# Patient Record
(demographics unavailable — no encounter records)

---

## 2025-05-08 NOTE — REASON FOR VISIT
[Follow-Up] : a follow-up visit for [Palpitations] : palpitations [Patient] : patient [Parents] : parents

## 2025-05-15 NOTE — CONSULT LETTER
[Today's Date] : [unfilled] [Name] : Name: [unfilled] [] : : ~~ [Today's Date:] : [unfilled] [Dear  ___:] : Dear Dr. [unfilled]: [Consult] : I had the pleasure of evaluating your patient, [unfilled]. My full evaluation follows. [Consult - Single Provider] : Thank you very much for allowing me to participate in the care of this patient. If you have any questions, please do not hesitate to contact me. [Sincerely,] : Sincerely, [FreeTextEntry4] : Felisha Whipple MD [FreeTextEntry5] : [address] [FreeTextEntry6] : [place] [address] [de-identified] : Anum Daley MD,FACC, FASJAMESON, FAAP\par  Pediatric Cardiologist \par  [facility]\par

## 2025-05-15 NOTE — CONSULT LETTER
[Today's Date] : [unfilled] [Name] : Name: [unfilled] [] : : ~~ [Today's Date:] : [unfilled] [Dear  ___:] : Dear Dr. [unfilled]: [Consult] : I had the pleasure of evaluating your patient, [unfilled]. My full evaluation follows. [Consult - Single Provider] : Thank you very much for allowing me to participate in the care of this patient. If you have any questions, please do not hesitate to contact me. [Sincerely,] : Sincerely, [FreeTextEntry4] : Felisha Whipple MD [FreeTextEntry5] : [address] [FreeTextEntry6] : [place] [address] [de-identified] : Anum Daley MD,FACC, FASJAMESON, FAAP\par  Pediatric Cardiologist \par  [facility]\par

## 2025-05-15 NOTE — CARDIOLOGY SUMMARY
[Today's Date] : [unfilled] [de-identified] : 5/8/25 [de-identified] : 1. Normal study. 2. Trivial pulmonary valve regurgitation. 3. Trivial tricuspid valve regurgitation. 4. Normal left ventricular size, morphology and systolic function. 5. Normal right ventricular morphology with qualitatively normal size and systolic function. 6. The E/e' ratios are suggestive of normal left ventricular filling pressure. 7. No pericardial effusion. [FreeTextEntry1] : Normal sinus rhythm with sinus arrhythmia. Atrial and ventricular forces were normal. No ST segment or T-wave abnormality. HR 98; QTc 443-456 [de-identified] : 5/8/25 [FreeTextEntry2] : 1. Normal study. 2. Trivial pulmonary valve regurgitation. 3. Trivial tricuspid valve regurgitation. 4. Normal left ventricular size, morphology and systolic function. 5. Normal right ventricular morphology with qualitatively normal size and systolic function. 6. The E/e' ratios are suggestive of normal left ventricular filling pressure. 7. No pericardial effusion. [de-identified] : 6/2/23 [de-identified] : The predominant rhythm was normal sinus rhythm alternating with sinus bradycardia, sinus tachycardia and sinus arrhythmia. HR , average 89 bpm.  There were rare isolated premature ventricular complexes which occurred at an average of 3 bph. There were no ventricular couplets or ventricular tachycardia.  There was one episode of ectopic atrial rhythm at 61 bpm, which was not quantified because it occurred at a similar rate to the underlying sinus rate. There was a single premature supraventricular complex. No supraventricular tachycardia.   No symptoms were reported during the study.    12/12/22: The predominant rhythm was normal sinus rhythm alternating with sinus bradycardia, sinus tachycardia and sinus arrhythmia. HR , avg 100 bpm There were rare isolated premature ventricular complexes which occurred at an average of 4 bph. There were no ventricular couplets or ventricular tachycardia.  There were rare isolated premature supraventricular complexes which occurred at an average of 0.2 bph. There were no couplets or supraventricular tachycardia.  No symptoms were reported during the study.  (11/15/22: PVC's  avg 3 bph; one ventricular couplet - ms. One complaint of ' heart rate sped up' while playing a video game, which corresponded to sinus rhythm at 120-125 bpm)

## 2025-05-15 NOTE — DISCUSSION/SUMMARY
[PE + No Restrictions] : [unfilled] may participate in the entire physical education program without restriction, including all varsity competitive sports. [FreeTextEntry1] : - In summary, RAJENDRA is a 13 year female with palpitations which tend to occur when she is anxious. Sometimes she feels the fast heart rate and then gets anxious. She hyperventilates which leads to paresthesia.  - Her HR and blood pressure was normal today, when it was repeated after she relaxed.  - A previous Holter monitor showed rare PVC's and one ventricular couplet, which did not correspond to her symptoms. The 2 subsequent Holter monitors showed only rare PVC's, but no couplets. The last Holter monitor also showed ectopic atrial rhythm. These findings are normal variants Holter monitor was placed today  - If there are recurrent palpitations, her HR should be checked. I recommended that her BP be checked after she is seated and relaxed for >5 minutes.    - There is no evidence of a cardiac etiology of hypertension such as coarctation of the aorta. There is no evidence of left ventricular hypertrophy which may occur with long standing or severe hypertension.  - She should maintain good hydration. She should drink at least 8 cups of non-caffeinated beverages per day.  Caffeinated beverages should be avoided. Her fluid intake should be titrated to keep the urine dilute.  - I would like to reevaluate her in 2 months or sooner if there are any further cardiac concerns. - The family verbalized understanding, and all questions were answered.  [Needs SBE Prophylaxis] : [unfilled] does not need bacterial endocarditis prophylaxis

## 2025-05-15 NOTE — HISTORY OF PRESENT ILLNESS
[FreeTextEntry1] : RAJENDRA is a 13 year old female presents for cardiology follow up due to palpitations, ventricular ectopy,  elevated blood pressure.  Since I last saw her 7/5/23, the palpitations had resolved, but recurred about a month ago.  Sometimes she feels anxious first, but sometimes feels the fast heart rate and then gets anxious. then she breaths deeper and fast, and then left upper arm feels numb. She tries to distract herself and it resolves after 10 minutes. if she ate prior to the episode, she gets abd pain during the palpitations and then has emesis.  When HR checked with pulse ox, it was 110 bpm.   Also, her BP has been higher at pediatric visits, and when checked at home-after the palpitations have started.  She has been active and otherwise asymptomatic. There has been no other complaint of chest pain, palpitations, dyspnea, dizziness or syncope.  She is anxious, and Dr Whipple referred to a therapist.   - plays softball, year round, good exercise tolerance . - Daily fluid intake:  Water- 5 cups?, unsure of amount. occasional caffeine  Review of history from visit 1/4/23 She had 2 episodes of palpitations since she was seen in November - Once in December, felt her heart beating fast, possibly after running in her house. Mother checked her HR and it was 121 bpm, /86. She does not recall the details. - On 12/23, in the evening, she felt her HR fast. , /96. It was the first day of her period which makes her anxious. - Her Holter monitor from 11/15/22 showed rare premature ventricular contractions and one ventricular couplet. the ventricular couplet occurred at 4:52 pm and there was no reported symptoms at that time.  A f/u Holter monitor 12/12/22 showed rare PVC's and no couplets or ventricular tachycardia   I reviewed the lab results from 11/19/22 : total cholesterol 175 mg/dl;  ; HDL 54 ; triglycerides 78.  CBC, CMP and TFT's were normal.  Review of history from visit 11/15/22:  On 11/12/22, Saturday, she was sitting looking at ipad, felt a mild increase in HR, then panicked and felt her HR increase more. 'watches medical stuff on TV/ videos when mother watches it. She has seen heart attacks on TV and is freaked out about death'. then felt a normal HR when relaxed and then it would go faster when anxious again. Continued to feel intermittently fast HR since then, on pulse ox HR's 120's - 140's bpm.  On Monday 11/14/22. SAw Dr Whipple, At beginning of visit  bpm, /100. After started to relax HR 95 with /68, referred to cardio. Given a prescription for CBC, CMP, TFT's, Lyme Last night, at home, she got anxious again. /101 on home BP monitor.  Brought to [facility] ED, there was a long wait time due to many patients with RSV, and her symptoms resolved so they went home.   - Mild left upper breast pain, resolves with rubbing it for a few seconds.  - seen by misael Dorsey. history of 'very mildly decreased TSH with normal thyroid hormone levels'. told to f/u prn per mother   - father - hypertension started ~ late 20's-30's on med. hypercholesterolemia dx in 30's tx medication. DVT in leg in his late 30's dx with thrombophilia (factor V Leiden?)  - PGF- open heart surgery - late 60's-70's.

## 2025-05-15 NOTE — HISTORY OF PRESENT ILLNESS
[FreeTextEntry1] : RAJENDRA is a 13 year old female presents for cardiology follow up due to palpitations, ventricular ectopy,  elevated blood pressure.  Since I last saw her 7/5/23, the palpitations had resolved, but recurred about a month ago.  Sometimes she feels anxious first, but sometimes feels the fast heart rate and then gets anxious. then she breaths deeper and fast, and then left upper arm feels numb. She tries to distract herself and it resolves after 10 minutes. if she ate prior to the episode, she gets abd pain during the palpitations and then has emesis.  When HR checked with pulse ox, it was 110 bpm.   Also, her BP has been higher at pediatric visits, and when checked at home-after the palpitations have started.  She has been active and otherwise asymptomatic. There has been no other complaint of chest pain, palpitations, dyspnea, dizziness or syncope.  She is anxious, and Dr Whipple referred to a therapist.   - plays softball, year round, good exercise tolerance . - Daily fluid intake:  Water- 5 cups?, unsure of amount. occasional caffeine  Review of history from visit 1/4/23 She had 2 episodes of palpitations since she was seen in November - Once in December, felt her heart beating fast, possibly after running in her house. Mother checked her HR and it was 121 bpm, /86. She does not recall the details. - On 12/23, in the evening, she felt her HR fast. , /96. It was the first day of her period which makes her anxious. - Her Holter monitor from 11/15/22 showed rare premature ventricular contractions and one ventricular couplet. the ventricular couplet occurred at 4:52 pm and there was no reported symptoms at that time.  A f/u Holter monitor 12/12/22 showed rare PVC's and no couplets or ventricular tachycardia   I reviewed the lab results from 11/19/22 : total cholesterol 175 mg/dl;  ; HDL 54 ; triglycerides 78.  CBC, CMP and TFT's were normal.  Review of history from visit 11/15/22:  On 11/12/22, Saturday, she was sitting looking at ipad, felt a mild increase in HR, then panicked and felt her HR increase more. 'watches medical stuff on TV/ videos when mother watches it. She has seen heart attacks on TV and is freaked out about death'. then felt a normal HR when relaxed and then it would go faster when anxious again. Continued to feel intermittently fast HR since then, on pulse ox HR's 120's - 140's bpm.  On Monday 11/14/22. SAw Dr Whipple, At beginning of visit  bpm, /100. After started to relax HR 95 with /68, referred to cardio. Given a prescription for CBC, CMP, TFT's, Lyme Last night, at home, she got anxious again. /101 on home BP monitor.  Brought to [facility] ED, there was a long wait time due to many patients with RSV, and her symptoms resolved so they went home.   - Mild left upper breast pain, resolves with rubbing it for a few seconds.  - seen by misael Dorsey. history of 'very mildly decreased TSH with normal thyroid hormone levels'. told to f/u prn per mother   - father - hypertension started ~ late 20's-30's on med. hypercholesterolemia dx in 30's tx medication. DVT in leg in his late 30's dx with thrombophilia (factor V Leiden?)  - PGF- open heart surgery - late 60's-70's.

## 2025-05-15 NOTE — CONSULT LETTER
[Today's Date] : [unfilled] [Name] : Name: [unfilled] [] : : ~~ [Today's Date:] : [unfilled] [Dear  ___:] : Dear Dr. [unfilled]: [Consult] : I had the pleasure of evaluating your patient, [unfilled]. My full evaluation follows. [Consult - Single Provider] : Thank you very much for allowing me to participate in the care of this patient. If you have any questions, please do not hesitate to contact me. [Sincerely,] : Sincerely, [FreeTextEntry4] : Felisha Whipple MD [FreeTextEntry5] : [address] [FreeTextEntry6] : [place] [address] [de-identified] : Anum Daley MD,FACC, FASJAMESON, FAAP\par  Pediatric Cardiologist \par  [facility]\par

## 2025-05-15 NOTE — ADDENDUM
[FreeTextEntry1] : Holter from 5/8/25 The predominant rhythm was normal sinus rhythm alternating with sinus bradycardia, sinus tachycardia, sinus arrhythmia and atrial escape rhythm. HR , avg 91 bpm  There were rare isolated premature ventricular complexes which occurred at an average of 8 bph. There were no ventricular couplets or ventricular tachycardia.  There were rare isolated premature supraventricular complexes which occurred at an average of 0.1 bph. No supraventricular couplets or supraventricular tachycardia.   No symptoms were reported during the study.   If still having symptoms, should make the next f/u appt for 2-3 months, If symptoms have resolved then can f/u in one yr, or sooner prn

## 2025-05-15 NOTE — HISTORY OF PRESENT ILLNESS
[FreeTextEntry1] : RAJENDRA is a 13 year old female presents for cardiology follow up due to palpitations, ventricular ectopy,  elevated blood pressure.  Since I last saw her 7/5/23, the palpitations had resolved, but recurred about a month ago.  Sometimes she feels anxious first, but sometimes feels the fast heart rate and then gets anxious. then she breaths deeper and fast, and then left upper arm feels numb. She tries to distract herself and it resolves after 10 minutes. if she ate prior to the episode, she gets abd pain during the palpitations and then has emesis.  When HR checked with pulse ox, it was 110 bpm.   Also, her BP has been higher at pediatric visits, and when checked at home-after the palpitations have started.  She has been active and otherwise asymptomatic. There has been no other complaint of chest pain, palpitations, dyspnea, dizziness or syncope.  She is anxious, and Dr Whipple referred to a therapist.   - plays softball, year round, good exercise tolerance . - Daily fluid intake:  Water- 5 cups?, unsure of amount. occasional caffeine  Review of history from visit 1/4/23 She had 2 episodes of palpitations since she was seen in November - Once in December, felt her heart beating fast, possibly after running in her house. Mother checked her HR and it was 121 bpm, /86. She does not recall the details. - On 12/23, in the evening, she felt her HR fast. , /96. It was the first day of her period which makes her anxious. - Her Holter monitor from 11/15/22 showed rare premature ventricular contractions and one ventricular couplet. the ventricular couplet occurred at 4:52 pm and there was no reported symptoms at that time.  A f/u Holter monitor 12/12/22 showed rare PVC's and no couplets or ventricular tachycardia   I reviewed the lab results from 11/19/22 : total cholesterol 175 mg/dl;  ; HDL 54 ; triglycerides 78.  CBC, CMP and TFT's were normal.  Review of history from visit 11/15/22:  On 11/12/22, Saturday, she was sitting looking at ipad, felt a mild increase in HR, then panicked and felt her HR increase more. 'watches medical stuff on TV/ videos when mother watches it. She has seen heart attacks on TV and is freaked out about death'. then felt a normal HR when relaxed and then it would go faster when anxious again. Continued to feel intermittently fast HR since then, on pulse ox HR's 120's - 140's bpm.  On Monday 11/14/22. SAw Dr Whipple, At beginning of visit  bpm, /100. After started to relax HR 95 with /68, referred to cardio. Given a prescription for CBC, CMP, TFT's, Lyme Last night, at home, she got anxious again. /101 on home BP monitor.  Brought to [facility] ED, there was a long wait time due to many patients with RSV, and her symptoms resolved so they went home.   - Mild left upper breast pain, resolves with rubbing it for a few seconds.  - seen by misael Dorsey. history of 'very mildly decreased TSH with normal thyroid hormone levels'. told to f/u prn per mother   - father - hypertension started ~ late 20's-30's on med. hypercholesterolemia dx in 30's tx medication. DVT in leg in his late 30's dx with thrombophilia (factor V Leiden?)  - PGF- open heart surgery - late 60's-70's.

## 2025-05-15 NOTE — CARDIOLOGY SUMMARY
[Today's Date] : [unfilled] [de-identified] : 5/8/25 [de-identified] : 1. Normal study. 2. Trivial pulmonary valve regurgitation. 3. Trivial tricuspid valve regurgitation. 4. Normal left ventricular size, morphology and systolic function. 5. Normal right ventricular morphology with qualitatively normal size and systolic function. 6. The E/e' ratios are suggestive of normal left ventricular filling pressure. 7. No pericardial effusion. [FreeTextEntry1] : Normal sinus rhythm with sinus arrhythmia. Atrial and ventricular forces were normal. No ST segment or T-wave abnormality. HR 98; QTc 443-456 [de-identified] : 5/8/25 [FreeTextEntry2] : 1. Normal study. 2. Trivial pulmonary valve regurgitation. 3. Trivial tricuspid valve regurgitation. 4. Normal left ventricular size, morphology and systolic function. 5. Normal right ventricular morphology with qualitatively normal size and systolic function. 6. The E/e' ratios are suggestive of normal left ventricular filling pressure. 7. No pericardial effusion. [de-identified] : 6/2/23 [de-identified] : The predominant rhythm was normal sinus rhythm alternating with sinus bradycardia, sinus tachycardia and sinus arrhythmia. HR , average 89 bpm.  There were rare isolated premature ventricular complexes which occurred at an average of 3 bph. There were no ventricular couplets or ventricular tachycardia.  There was one episode of ectopic atrial rhythm at 61 bpm, which was not quantified because it occurred at a similar rate to the underlying sinus rate. There was a single premature supraventricular complex. No supraventricular tachycardia.   No symptoms were reported during the study.    12/12/22: The predominant rhythm was normal sinus rhythm alternating with sinus bradycardia, sinus tachycardia and sinus arrhythmia. HR , avg 100 bpm There were rare isolated premature ventricular complexes which occurred at an average of 4 bph. There were no ventricular couplets or ventricular tachycardia.  There were rare isolated premature supraventricular complexes which occurred at an average of 0.2 bph. There were no couplets or supraventricular tachycardia.  No symptoms were reported during the study.  (11/15/22: PVC's  avg 3 bph; one ventricular couplet - ms. One complaint of ' heart rate sped up' while playing a video game, which corresponded to sinus rhythm at 120-125 bpm)

## 2025-05-15 NOTE — REVIEW OF SYSTEMS
[Palpitations] : palpitations [Vomiting] : vomiting [Headache] : headache [Feeling Poorly] : not feeling poorly (malaise) [Fever] : no fever [Wgt Loss (___ Lbs)] : no recent weight loss [Pallor] : not pale [Eye Discharge] : no eye discharge [Redness] : no redness [Change in Vision] : no change in vision [Nasal Stuffiness] : no nasal congestion [Sore Throat] : no sore throat [Earache] : no earache [Loss Of Hearing] : no hearing loss [Cyanosis] : no cyanosis [Edema] : no edema [Diaphoresis] : not diaphoretic [Chest Pain] : no chest pain or discomfort [Exercise Intolerance] : no persistence of exercise intolerance [Orthopnea] : no orthopnea [Fast HR] : no tachycardia [Tachypnea] : not tachypneic [Wheezing] : no wheezing [Cough] : no cough [Shortness Of Breath] : not expressed as feeling short of breath [Diarrhea] : no diarrhea [Abdominal Pain] : no abdominal pain [Decrease In Appetite] : appetite not decreased [Fainting (Syncope)] : no fainting [Seizure] : no seizures [Dizziness] : no dizziness [Limping] : no limping [Joint Pains] : no arthralgias [Joint Swelling] : no joint swelling [Rash] : no rash [Wound problems] : no wound problems [Easy Bruising] : no tendency for easy bruising [Swollen Glands] : no lymphadenopathy [Easy Bleeding] : no ~M tendency for easy bleeding [Nosebleeds] : no epistaxis [Sleep Disturbances] : ~T no sleep disturbances [Hyperactive] : no hyperactive behavior [Depression] : no depression [Anxiety] : no anxiety [Failure To Thrive] : no failure to thrive [Short Stature] : short stature was not noted [Jitteriness] : no jitteriness [Heat/Cold Intolerance] : no temperature intolerance [Dec Urine Output] : no oliguria [FreeTextEntry1] : nosebleeds x4 in [place]

## 2025-05-15 NOTE — CARDIOLOGY SUMMARY
[Today's Date] : [unfilled] [de-identified] : 5/8/25 [de-identified] : 1. Normal study. 2. Trivial pulmonary valve regurgitation. 3. Trivial tricuspid valve regurgitation. 4. Normal left ventricular size, morphology and systolic function. 5. Normal right ventricular morphology with qualitatively normal size and systolic function. 6. The E/e' ratios are suggestive of normal left ventricular filling pressure. 7. No pericardial effusion. [FreeTextEntry1] : Normal sinus rhythm with sinus arrhythmia. Atrial and ventricular forces were normal. No ST segment or T-wave abnormality. HR 98; QTc 443-456 [de-identified] : 5/8/25 [FreeTextEntry2] : 1. Normal study. 2. Trivial pulmonary valve regurgitation. 3. Trivial tricuspid valve regurgitation. 4. Normal left ventricular size, morphology and systolic function. 5. Normal right ventricular morphology with qualitatively normal size and systolic function. 6. The E/e' ratios are suggestive of normal left ventricular filling pressure. 7. No pericardial effusion. [de-identified] : 6/2/23 [de-identified] : The predominant rhythm was normal sinus rhythm alternating with sinus bradycardia, sinus tachycardia and sinus arrhythmia. HR , average 89 bpm.  There were rare isolated premature ventricular complexes which occurred at an average of 3 bph. There were no ventricular couplets or ventricular tachycardia.  There was one episode of ectopic atrial rhythm at 61 bpm, which was not quantified because it occurred at a similar rate to the underlying sinus rate. There was a single premature supraventricular complex. No supraventricular tachycardia.   No symptoms were reported during the study.    12/12/22: The predominant rhythm was normal sinus rhythm alternating with sinus bradycardia, sinus tachycardia and sinus arrhythmia. HR , avg 100 bpm There were rare isolated premature ventricular complexes which occurred at an average of 4 bph. There were no ventricular couplets or ventricular tachycardia.  There were rare isolated premature supraventricular complexes which occurred at an average of 0.2 bph. There were no couplets or supraventricular tachycardia.  No symptoms were reported during the study.  (11/15/22: PVC's  avg 3 bph; one ventricular couplet - ms. One complaint of ' heart rate sped up' while playing a video game, which corresponded to sinus rhythm at 120-125 bpm)